# Patient Record
Sex: FEMALE | Race: WHITE | Employment: FULL TIME | URBAN - METROPOLITAN AREA
[De-identification: names, ages, dates, MRNs, and addresses within clinical notes are randomized per-mention and may not be internally consistent; named-entity substitution may affect disease eponyms.]

---

## 2017-02-27 PROCEDURE — 87086 URINE CULTURE/COLONY COUNT: CPT | Performed by: FAMILY MEDICINE

## 2017-02-27 PROCEDURE — 87186 SC STD MICRODIL/AGAR DIL: CPT | Performed by: FAMILY MEDICINE

## 2017-02-27 PROCEDURE — 87088 URINE BACTERIA CULTURE: CPT | Performed by: FAMILY MEDICINE

## 2017-10-13 PROCEDURE — 86803 HEPATITIS C AB TEST: CPT | Performed by: FAMILY MEDICINE

## 2018-02-09 PROBLEM — R09.82 POST-NASAL DRAINAGE: Status: ACTIVE | Noted: 2018-02-09

## 2018-02-09 PROBLEM — J30.1 NON-SEASONAL ALLERGIC RHINITIS DUE TO POLLEN: Status: ACTIVE | Noted: 2018-02-09

## 2019-01-18 PROBLEM — J30.1 NON-SEASONAL ALLERGIC RHINITIS DUE TO POLLEN: Status: RESOLVED | Noted: 2018-02-09 | Resolved: 2019-01-18

## 2019-01-18 PROBLEM — R09.82 POST-NASAL DRAINAGE: Status: RESOLVED | Noted: 2018-02-09 | Resolved: 2019-01-18

## 2019-01-18 PROBLEM — K21.9 GASTROESOPHAGEAL REFLUX DISEASE, ESOPHAGITIS PRESENCE NOT SPECIFIED: Status: ACTIVE | Noted: 2019-01-18

## 2019-01-18 PROBLEM — L71.9 ACNE ROSACEA: Status: ACTIVE | Noted: 2019-01-18

## 2019-01-18 PROBLEM — F41.9 ANXIETY: Status: ACTIVE | Noted: 2019-01-18

## 2019-01-18 PROBLEM — M54.2 MUSCULOSKELETAL NECK PAIN: Status: ACTIVE | Noted: 2019-01-18

## 2019-01-18 PROBLEM — S93.491A SPRAIN OF ANTERIOR TALOFIBULAR LIGAMENT OF RIGHT ANKLE, INITIAL ENCOUNTER: Status: ACTIVE | Noted: 2019-01-18

## 2019-01-18 PROBLEM — J30.9 ALLERGIC RHINITIS, UNSPECIFIED SEASONALITY, UNSPECIFIED TRIGGER: Status: ACTIVE | Noted: 2019-01-18

## 2019-08-09 PROBLEM — R13.10 DYSPHAGIA: Status: ACTIVE | Noted: 2019-08-09

## 2019-08-17 ENCOUNTER — HOSPITAL ENCOUNTER (OUTPATIENT)
Dept: GENERAL RADIOLOGY | Facility: HOSPITAL | Age: 54
Discharge: HOME | End: 2019-08-17
Attending: INTERNAL MEDICINE
Payer: COMMERCIAL

## 2019-08-17 DIAGNOSIS — R13.10 DYSPHAGIA, UNSPECIFIED TYPE: ICD-10-CM

## 2019-08-17 PROCEDURE — 74220 X-RAY XM ESOPHAGUS 1CNTRST: CPT | Performed by: INTERNAL MEDICINE

## 2019-08-17 PROCEDURE — 92611 MOTION FLUOROSCOPY/SWALLOW: CPT

## 2019-08-17 PROCEDURE — 74230 X-RAY XM SWLNG FUNCJ C+: CPT | Performed by: INTERNAL MEDICINE

## 2019-08-17 NOTE — PROGRESS NOTES
ADULT VIDEOFLUOROSCOPIC SWALLOWING STUDY    Admission Date: 8/17/2019  Evaluation Date: 08/17/19  Radiologist: Dr Tesha Queen completed in conjunction with Radiologist to assess oropharyngeal swallow function and assess questions answered to their apparent satisfaction. INTERDISCIPLINARY COMMUNICATION  Reviewed results with Radiologist; agreement verbalized.       Current status G Code: Initial, Swallowing, CH: 0% impaired  Goal status G Code: Swallowing, CH: 0% impaire

## 2019-08-24 NOTE — PROGRESS NOTES
Date: 2019    To: Zac Rosadotrey  : 1965    I hope this letter finds you doing well. I am writing to inform you of the following:        The results of your recent x-ray tests were normal.         Please call the office at [phone] if

## 2020-01-29 PROBLEM — K44.9 HIATAL HERNIA: Status: ACTIVE | Noted: 2020-01-29

## 2020-01-29 PROBLEM — K21.9 GASTROESOPHAGEAL REFLUX DISEASE, ESOPHAGITIS PRESENCE NOT SPECIFIED: Status: RESOLVED | Noted: 2019-01-18 | Resolved: 2020-01-29

## 2021-07-11 ENCOUNTER — HOSPITAL ENCOUNTER (OUTPATIENT)
Age: 56
Discharge: HOME | End: 2021-07-11
Payer: COMMERCIAL

## 2021-07-11 VITALS
SYSTOLIC BLOOD PRESSURE: 130 MMHG | DIASTOLIC BLOOD PRESSURE: 75 MMHG | HEART RATE: 55 BPM | TEMPERATURE: 99 F | RESPIRATION RATE: 12 BRPM | OXYGEN SATURATION: 100 %

## 2021-07-11 DIAGNOSIS — S16.1XXA STRAIN OF NECK MUSCLE, INITIAL ENCOUNTER: ICD-10-CM

## 2021-07-11 DIAGNOSIS — R42 VERTIGO: Primary | ICD-10-CM

## 2021-07-11 LAB
#MXD IC: 0.5 X10ˆ3/UL (ref 0.1–1)
CREAT BLD-MCNC: 0.7 MG/DL
GLUCOSE BLD-MCNC: 103 MG/DL (ref 70–99)
HCT VFR BLD AUTO: 35.3 %
HGB BLD-MCNC: 11.5 G/DL
ISTAT BUN: 17 MG/DL (ref 7–18)
ISTAT CHLORIDE: 101 MMOL/L (ref 98–112)
ISTAT HEMATOCRIT: 35 %
ISTAT IONIZED CALCIUM FOR CHEM 8: 1.29 MMOL/L (ref 1.12–1.32)
ISTAT POTASSIUM: 3.9 MMOL/L (ref 3.6–5.1)
ISTAT SODIUM: 140 MMOL/L (ref 136–145)
ISTAT TCO2: 27 MMOL/L (ref 21–32)
LYMPHOCYTES # BLD AUTO: 1.8 X10ˆ3/UL (ref 1–4)
LYMPHOCYTES NFR BLD AUTO: 30 %
MCH RBC QN AUTO: 30.5 PG (ref 26–34)
MCHC RBC AUTO-ENTMCNC: 32.6 G/DL (ref 31–37)
MCV RBC AUTO: 93.6 FL (ref 80–100)
MIXED CELL %: 7.9 %
NEUTROPHILS # BLD AUTO: 3.8 X10ˆ3/UL (ref 1.5–7.7)
NEUTROPHILS NFR BLD AUTO: 62.1 %
PLATELET # BLD AUTO: 280 X10ˆ3/UL (ref 150–450)
POCT BILIRUBIN URINE: NEGATIVE
POCT BLOOD URINE: NEGATIVE
POCT GLUCOSE URINE: NEGATIVE MG/DL
POCT KETONE URINE: NEGATIVE MG/DL
POCT LEUKOCYTE ESTERASE URINE: NEGATIVE
POCT NITRITE URINE: NEGATIVE
POCT PH URINE: 6.5 (ref 5–8)
POCT PROTEIN URINE: NEGATIVE MG/DL
POCT SPECIFIC GRAVITY URINE: 1.01
POCT URINE CLARITY: CLEAR
POCT URINE COLOR: YELLOW
POCT UROBILINOGEN URINE: 0.2 MG/DL
RBC # BLD AUTO: 3.77 X10ˆ6/UL
WBC # BLD AUTO: 6.1 X10ˆ3/UL (ref 4–11)

## 2021-07-11 PROCEDURE — 81002 URINALYSIS NONAUTO W/O SCOPE: CPT | Performed by: NURSE PRACTITIONER

## 2021-07-11 PROCEDURE — 80047 BASIC METABLC PNL IONIZED CA: CPT | Performed by: NURSE PRACTITIONER

## 2021-07-11 PROCEDURE — 85025 COMPLETE CBC W/AUTO DIFF WBC: CPT | Performed by: NURSE PRACTITIONER

## 2021-07-11 PROCEDURE — 93000 ELECTROCARDIOGRAM COMPLETE: CPT | Performed by: NURSE PRACTITIONER

## 2021-07-11 PROCEDURE — 99204 OFFICE O/P NEW MOD 45 MIN: CPT | Performed by: NURSE PRACTITIONER

## 2021-07-11 RX ORDER — MECLIZINE HCL 12.5 MG/1
25 TABLET ORAL ONCE
Status: COMPLETED | OUTPATIENT
Start: 2021-07-11 | End: 2021-07-11

## 2021-07-11 RX ORDER — METHYLPREDNISOLONE 4 MG/1
TABLET ORAL
Qty: 1 EACH | Refills: 0 | Status: SHIPPED | OUTPATIENT
Start: 2021-07-11 | End: 2022-01-06

## 2021-07-11 RX ORDER — SODIUM CHLORIDE 9 MG/ML
1000 INJECTION, SOLUTION INTRAVENOUS ONCE
Status: COMPLETED | OUTPATIENT
Start: 2021-07-11 | End: 2021-07-11

## 2021-07-11 RX ORDER — MECLIZINE HYDROCHLORIDE 25 MG/1
25 TABLET ORAL 3 TIMES DAILY PRN
Qty: 15 TABLET | Refills: 0 | Status: SHIPPED | OUTPATIENT
Start: 2021-07-11 | End: 2022-01-06

## 2021-07-11 NOTE — ED INITIAL ASSESSMENT (HPI)
Pt states she has had allergies lately and since Friday had some congestion and runny nose. Pt woke yesterday with positional dizziness. States when she lays down she is dizzy but it goes away when upright. Reports some neck spasm during the night.

## 2021-07-11 NOTE — ED PROVIDER NOTES
Patient Seen in: Immediate [facility]      History   No chief complaint on file. Stated Complaint: Dizziness     HPI/Subjective:   13-year-old female presents today with complaints of positional dizziness.   States that about 3 nights ago woke up in t EPIDURAL;  Surgeon: Soledad Ruano MD;  Location: [facility]   • INJECTION, ANESTHETIC/STEROID, TRANSFORAMINAL EPIDURAL; LUMBAR/SACRAL, SINGLE LEVEL  12/3/2012    Procedure: TRANSFORAMINAL EPIDURAL - LUMBAR;  Surgeon: Wilner Santos Mouth: Mucous membranes are moist.   Eyes:      Extraocular Movements:      Right eye: Nystagmus present. Left eye: Nystagmus present. Conjunctiva/sclera: Conjunctivae normal.      Pupils: Pupils are equal, round, and reactive to light.    Cardio Patient resents today with complaints of left-sided neck pain with vertigo. Does have increased dizziness when laying flat or laying on her side. Does not complain of any dizziness when sitting upright or walking.   Neuro exam was normal.  Urine dip s

## 2021-07-12 LAB
ATRIAL RATE: 62 BPM
P AXIS: 68 DEGREES
P-R INTERVAL: 162 MS
Q-T INTERVAL: 416 MS
QRS DURATION: 90 MS
QTC CALCULATION (BEZET): 422 MS
R AXIS: 41 DEGREES
T AXIS: 39 DEGREES
VENTRICULAR RATE: 62 BPM

## 2022-12-19 ENCOUNTER — LAB ENCOUNTER (OUTPATIENT)
Dept: LAB | Age: 57
End: 2022-12-19
Attending: INTERNAL MEDICINE
Payer: COMMERCIAL

## 2022-12-19 DIAGNOSIS — Z01.818 PRE-OP TESTING: ICD-10-CM

## 2022-12-20 LAB — SARS-COV-2 RNA RESP QL NAA+PROBE: NOT DETECTED

## 2022-12-22 PROBLEM — R13.10 DYSPHAGIA, UNSPECIFIED: Status: ACTIVE | Noted: 2022-12-22

## 2022-12-22 PROBLEM — Z12.11 ENCOUNTER FOR SCREENING COLONOSCOPY: Status: ACTIVE | Noted: 2022-12-22

## 2022-12-24 ENCOUNTER — HOSPITAL ENCOUNTER (OUTPATIENT)
Age: 57
Discharge: HOME | End: 2022-12-24
Payer: COMMERCIAL

## 2022-12-24 VITALS
HEART RATE: 88 BPM | SYSTOLIC BLOOD PRESSURE: 126 MMHG | DIASTOLIC BLOOD PRESSURE: 70 MMHG | RESPIRATION RATE: 18 BRPM | TEMPERATURE: 98 F | OXYGEN SATURATION: 100 %

## 2022-12-24 DIAGNOSIS — N30.01 ACUTE CYSTITIS WITH HEMATURIA: Primary | ICD-10-CM

## 2022-12-24 LAB
POCT BILIRUBIN URINE: NEGATIVE
POCT GLUCOSE URINE: NEGATIVE MG/DL
POCT KETONE URINE: NEGATIVE MG/DL
POCT NITRITE URINE: POSITIVE
POCT PH URINE: 6 (ref 5–8)
POCT SPECIFIC GRAVITY URINE: 1.02
POCT UROBILINOGEN URINE: 0.2 MG/DL

## 2022-12-24 PROCEDURE — 81002 URINALYSIS NONAUTO W/O SCOPE: CPT | Performed by: NURSE PRACTITIONER

## 2022-12-24 PROCEDURE — 99213 OFFICE O/P EST LOW 20 MIN: CPT | Performed by: NURSE PRACTITIONER

## 2022-12-24 RX ORDER — NITROFURANTOIN 25; 75 MG/1; MG/1
100 CAPSULE ORAL 2 TIMES DAILY
Qty: 14 CAPSULE | Refills: 0 | Status: SHIPPED | OUTPATIENT
Start: 2022-12-24 | End: 2022-12-27 | Stop reason: ALTCHOICE

## 2022-12-24 RX ORDER — PHENAZOPYRIDINE HYDROCHLORIDE 100 MG/1
100 TABLET, FILM COATED ORAL 3 TIMES DAILY PRN
Qty: 6 TABLET | Refills: 0 | Status: SHIPPED | OUTPATIENT
Start: 2022-12-24 | End: 2022-12-31

## 2022-12-27 RX ORDER — CIPROFLOXACIN 500 MG/1
500 TABLET, FILM COATED ORAL 2 TIMES DAILY
Qty: 10 TABLET | Refills: 0 | Status: SHIPPED | OUTPATIENT
Start: 2022-12-27 | End: 2023-01-01

## 2023-03-26 ENCOUNTER — HOSPITAL ENCOUNTER (OUTPATIENT)
Age: 58
Discharge: HOME | End: 2023-03-26
Payer: COMMERCIAL

## 2023-03-26 VITALS
DIASTOLIC BLOOD PRESSURE: 75 MMHG | OXYGEN SATURATION: 100 % | TEMPERATURE: 98 F | RESPIRATION RATE: 17 BRPM | SYSTOLIC BLOOD PRESSURE: 146 MMHG | HEART RATE: 67 BPM

## 2023-03-26 DIAGNOSIS — R00.2 PALPITATIONS: Primary | ICD-10-CM

## 2023-03-26 LAB
#MXD IC: 0.6 X10ˆ3/UL (ref 0.1–1)
ATRIAL RATE: 73 BPM
BUN BLD-MCNC: 16 MG/DL (ref 7–18)
CHLORIDE BLD-SCNC: 101 MMOL/L (ref 98–112)
CO2 BLD-SCNC: 26 MMOL/L (ref 21–32)
CREAT BLD-MCNC: 0.7 MG/DL
GFR SERPLBLD BASED ON 1.73 SQ M-ARVRAT: 100 ML/MIN/1.73M2 (ref 60–?)
GLUCOSE BLD-MCNC: 97 MG/DL (ref 70–99)
HCT VFR BLD AUTO: 37.2 %
HCT VFR BLD CALC: 36 %
HGB BLD-MCNC: 12.2 G/DL
ISTAT IONIZED CALCIUM FOR CHEM 8: 1.15 MMOL/L (ref 1.12–1.32)
LYMPHOCYTES # BLD AUTO: 1.8 X10ˆ3/UL (ref 1–4)
LYMPHOCYTES NFR BLD AUTO: 30.2 %
MCH RBC QN AUTO: 30.3 PG (ref 26–34)
MCHC RBC AUTO-ENTMCNC: 32.8 G/DL (ref 31–37)
MCV RBC AUTO: 92.3 FL (ref 80–100)
MIXED CELL %: 10.5 %
NEUTROPHILS # BLD AUTO: 3.4 X10ˆ3/UL (ref 1.5–7.7)
NEUTROPHILS NFR BLD AUTO: 59.3 %
P AXIS: 78 DEGREES
P-R INTERVAL: 158 MS
PLATELET # BLD AUTO: 277 X10ˆ3/UL (ref 150–450)
POTASSIUM BLD-SCNC: 3.8 MMOL/L (ref 3.6–5.1)
Q-T INTERVAL: 380 MS
QRS DURATION: 92 MS
QTC CALCULATION (BEZET): 418 MS
R AXIS: 36 DEGREES
RBC # BLD AUTO: 4.03 X10ˆ6/UL
SODIUM BLD-SCNC: 139 MMOL/L (ref 136–145)
T AXIS: 50 DEGREES
TROPONIN I BLD-MCNC: <0.02 NG/ML
VENTRICULAR RATE: 73 BPM
WBC # BLD AUTO: 5.8 X10ˆ3/UL (ref 4–11)

## 2023-03-26 PROCEDURE — 93000 ELECTROCARDIOGRAM COMPLETE: CPT | Performed by: NURSE PRACTITIONER

## 2023-03-26 PROCEDURE — 99213 OFFICE O/P EST LOW 20 MIN: CPT | Performed by: NURSE PRACTITIONER

## 2023-03-26 PROCEDURE — 80047 BASIC METABLC PNL IONIZED CA: CPT | Performed by: NURSE PRACTITIONER

## 2023-03-26 PROCEDURE — 84484 ASSAY OF TROPONIN QUANT: CPT | Performed by: NURSE PRACTITIONER

## 2023-03-26 PROCEDURE — 85025 COMPLETE CBC W/AUTO DIFF WBC: CPT | Performed by: NURSE PRACTITIONER

## 2023-03-26 RX ORDER — TRIAMCINOLONE ACETONIDE 1 MG/G
CREAM TOPICAL
COMMUNITY
Start: 2022-09-16

## 2023-03-26 RX ORDER — SODIUM CHLORIDE 9 MG/ML
1000 INJECTION, SOLUTION INTRAVENOUS ONCE
Status: COMPLETED | OUTPATIENT
Start: 2023-03-26 | End: 2023-03-26

## 2023-03-26 RX ORDER — LACTOBACILLUS ACIDOPHILUS 10B CELL
CAPSULE ORAL AS DIRECTED
COMMUNITY

## 2023-03-26 RX ORDER — ESTRADIOL 0.1 MG/G
CREAM VAGINAL
COMMUNITY
Start: 2022-11-04

## 2023-03-26 NOTE — ED INITIAL ASSESSMENT (HPI)
Pt has been feeling lightheaded for one week, has been checking her b/p, last night her monitor read \"irregular\" heart beat. Denies CP/SOB.

## 2023-03-26 NOTE — DISCHARGE INSTRUCTIONS
You need to call cardiology tomorrow     Take medications as prescribed     You should return to the ER with any worsening symptoms or concerns.

## 2023-08-09 ENCOUNTER — HOSPITAL ENCOUNTER (OUTPATIENT)
Age: 58
Discharge: HOME | End: 2023-08-09
Payer: COMMERCIAL

## 2023-08-09 VITALS
WEIGHT: 130 LBS | OXYGEN SATURATION: 100 % | TEMPERATURE: 98 F | SYSTOLIC BLOOD PRESSURE: 124 MMHG | DIASTOLIC BLOOD PRESSURE: 63 MMHG | HEART RATE: 90 BPM | RESPIRATION RATE: 16 BRPM | HEIGHT: 64 IN | BODY MASS INDEX: 22.2 KG/M2

## 2023-08-09 DIAGNOSIS — N30.01 ACUTE CYSTITIS WITH HEMATURIA: Primary | ICD-10-CM

## 2023-08-09 LAB
BILIRUB UR QL STRIP: NEGATIVE
COLOR UR: YELLOW
GLUCOSE UR STRIP-MCNC: NEGATIVE MG/DL
KETONES UR STRIP-MCNC: NEGATIVE MG/DL
NITRITE UR QL STRIP: NEGATIVE
PH UR STRIP: 7 [PH]
PROT UR STRIP-MCNC: NEGATIVE MG/DL
SP GR UR STRIP: 1.01
UROBILINOGEN UR STRIP-ACNC: <2 MG/DL

## 2023-08-09 PROCEDURE — 99213 OFFICE O/P EST LOW 20 MIN: CPT | Performed by: PHYSICIAN ASSISTANT

## 2023-08-09 PROCEDURE — 81002 URINALYSIS NONAUTO W/O SCOPE: CPT | Performed by: PHYSICIAN ASSISTANT

## 2023-08-09 RX ORDER — PHENAZOPYRIDINE HYDROCHLORIDE 100 MG/1
100 TABLET, FILM COATED ORAL 3 TIMES DAILY PRN
Qty: 6 TABLET | Refills: 0 | Status: SHIPPED | OUTPATIENT
Start: 2023-08-09 | End: 2023-08-16

## 2023-08-09 RX ORDER — CIPROFLOXACIN 500 MG/1
500 TABLET, FILM COATED ORAL 2 TIMES DAILY
Qty: 10 TABLET | Refills: 0 | Status: SHIPPED | OUTPATIENT
Start: 2023-08-09 | End: 2023-08-14

## 2023-08-09 NOTE — DISCHARGE INSTRUCTIONS
Take Cipro twice a day until gone  Use Pyridium as needed for pain  Follow-up with primary care doctor in 48-72 hours for recheck   Return to the Urgent care or emergency room if symptoms worsen

## 2023-08-09 NOTE — ED INITIAL ASSESSMENT (HPI)
Pt with c/o urinary urgency and frequency, has a different sensation when urinating but unable to describe. Denies pain or burning with urination. Noticed blood in the urine this morning. Has had symptoms for 3 days.        Pt states she also has some lower back pain but unsure if related because she has been having sciatic pain for over a week now

## 2023-12-12 ENCOUNTER — TELEPHONE (OUTPATIENT)
Dept: UROLOGY | Facility: CLINIC | Age: 58
End: 2023-12-12

## 2023-12-12 NOTE — TELEPHONE ENCOUNTER
Returned patient's voicemail regarding upcoming NP appointment in 03/2024. Patient states that since she is having CT of her abdominal, patient asked if she needed any other \"pictures\" taken while she's \"in there\". Patient expressed understanding that as patient has not yet been seen, physician will not be able to assess until appt.   Patient confirmed March appointment and confirmed being on waitlist.

## 2024-03-01 ENCOUNTER — TELEPHONE (OUTPATIENT)
Dept: UROLOGY | Facility: CLINIC | Age: 59
End: 2024-03-01

## 2024-03-01 NOTE — TELEPHONE ENCOUNTER
Spoke with patient to remind of 3/3/24 appointment. Provided New Patient instructions, patient expressed understanding and confirmed appointment.

## 2024-03-05 ENCOUNTER — OFFICE VISIT (OUTPATIENT)
Dept: UROLOGY | Facility: CLINIC | Age: 59
End: 2024-03-05
Attending: OBSTETRICS & GYNECOLOGY
Payer: COMMERCIAL

## 2024-03-05 VITALS
HEIGHT: 63 IN | BODY MASS INDEX: 24.84 KG/M2 | WEIGHT: 140.19 LBS | TEMPERATURE: 98 F | DIASTOLIC BLOOD PRESSURE: 76 MMHG | SYSTOLIC BLOOD PRESSURE: 122 MMHG

## 2024-03-05 DIAGNOSIS — N81.2 UTEROVAGINAL PROLAPSE, INCOMPLETE: ICD-10-CM

## 2024-03-05 DIAGNOSIS — N39.3 FEMALE STRESS INCONTINENCE: ICD-10-CM

## 2024-03-05 DIAGNOSIS — N39.0 RECURRENT UTI: Primary | ICD-10-CM

## 2024-03-05 DIAGNOSIS — R35.1 NOCTURIA: ICD-10-CM

## 2024-03-05 DIAGNOSIS — N81.84 PELVIC MUSCLE WASTING: ICD-10-CM

## 2024-03-05 DIAGNOSIS — N95.2 POSTMENOPAUSAL ATROPHIC VAGINITIS: ICD-10-CM

## 2024-03-05 DIAGNOSIS — N39.41 URGE INCONTINENCE: ICD-10-CM

## 2024-03-05 LAB
BLOOD URINE: NEGATIVE
CONTROL RUN WITHIN 24 HOURS?: YES
NITRITE URINE: NEGATIVE

## 2024-03-05 PROCEDURE — 51701 INSERT BLADDER CATHETER: CPT | Performed by: OBSTETRICS & GYNECOLOGY

## 2024-03-05 PROCEDURE — 87086 URINE CULTURE/COLONY COUNT: CPT | Performed by: OBSTETRICS & GYNECOLOGY

## 2024-03-05 PROCEDURE — 81002 URINALYSIS NONAUTO W/O SCOPE: CPT | Performed by: OBSTETRICS & GYNECOLOGY

## 2024-03-05 PROCEDURE — 99212 OFFICE O/P EST SF 10 MIN: CPT

## 2024-03-05 RX ORDER — METHENAMINE HIPPURATE 1000 MG/1
1 TABLET ORAL 2 TIMES DAILY
Qty: 180 TABLET | Refills: 1 | Status: SHIPPED | OUTPATIENT
Start: 2024-03-05

## 2024-03-05 NOTE — PROCEDURES
Pt voided 340mL in privacy of the bathroom.  PVR collected per Dr. العراقي = 10mL.  See dipstick, collected and sent for culture.

## 2024-03-05 NOTE — PROGRESS NOTES
Natividad العراقي, DO  3/5/2024     Prev pt  Last seen , rx'd PFPT (50% improvement)  Did PTNS, insurance coverage issues - stopped    Chief Complaint   Patient presents with    Recurrent UTI     Self referred, previous [facility] patient of Dr. العراقي, last visit 10- for UVP, LISSETTE, UUI    Incontinence     UUI>LISSETTE     HPI:  +LISSETTE  +UUI  Nocturia x2  +prolapse  Not sexually active, n/a dyspareunia  Reg bowels    PRIOR TREATMENTS:    Kegels  Estrace Cream    + UTIs  23 >100k proteus s/p cipro  10/23/23 50-100k klebsiella s/p NTF  23 klebsiella   klebsiella    No gross hematuria    , vdx 2    Known kidney stones  CT - no hydro, multi calculi (7mm, 2mm, 2mm)  MRI - renal cyst    Saw urology - latchemsetty, exp mgmt    UDS 2019  Nl voids  nursing home 274cc  DO at capacity  LISSETTE at 100cc    Vitals:  /76   Temp 98 °F (36.7 °C)   Ht 63\"   Wt 140 lb 3.2 oz (63.6 kg)   BMI 24.84 kg/m²      HISTORY:  Past Medical History:   Diagnosis Date    Abdominal hernia     Abnormal mammogram 2014    right breast    Anemia     Arthritis     Back pain     Belching     Blurred vision     Calculus of kidney     Change in hair     Endometriosis     Environmental allergies     Fatigue     Flatulence/gas pain/belching     Food intolerance     Frequent urination     Gastroesophageal reflux disease, esophagitis presence not specified 2019    Headache disorder     Hearing loss     Heart palpitations     Hemorrhoids     Hoarseness, chronic     Itch of skin     Leaking of urine     OTHER DISEASES     back pain    Pain in joints     Painful swallowing     Problems with swallowing     Raynaud's disease     Sleep disturbance     Stress     Vertigo     Wears glasses       Past Surgical History:   Procedure Laterality Date    COLONOSCOPY      EGD      FLUOR GID & LOCLZJ NDL/CATH SPI DX/THER NJX  2012    Procedure: LUMBAR EPIDURAL;  Surgeon: Ferny Landis MD;  Location: [facility]    FLUOR GID  & LOCLZJ NDL/CATH SPI DX/THER NJX  01/21/2013    Procedure: LUMBAR EPIDURAL;  Surgeon: Ferny Landis MD;  Location: [facility]    INJECTION THERAPY VEIN,MULT VEINS Bilateral 03/24/2023    INJECTION, ANESTHETIC/STEROID, TRANSFORAMINAL EPIDURAL; LUMBAR/SACRAL, SINGLE LEVEL  12/03/2012    Procedure: TRANSFORAMINAL EPIDURAL - LUMBAR;  Surgeon: Ferny Landis MD;  Location: [facility]    INJECTION, W/WO CONTRAST, DX/THERAPEUTIC SUBSTANCE, EPIDURAL/SUBARACHNOID; LUMBAR/SACRAL  12/17/2012    Procedure: LUMBAR EPIDURAL;  Surgeon: Ferny Landis MD;  Location: [facility]    INJECTION, W/WO CONTRAST, DX/THERAPEUTIC SUBSTANCE, EPIDURAL/SUBARACHNOID; LUMBAR/SACRAL  01/21/2013    Procedure: LUMBAR EPIDURAL;  Surgeon: Ferny Landis MD;  Location: [facility]    OTHER SURGICAL HISTORY  2006    vein stripping    OTHER SURGICAL HISTORY  2006    ovarian cyst    STAB PHLEBECTOMY, VARICOSE VEINS, 1 EXTREMITY; >20 INCISIONS  2003    EVLT    TONSILLECTOMY        Family History   Problem Relation Age of Onset    Heart Disorder Father         Heart attack-stent palced    Hypertension Father     Lipids Father     Heart Attack Father     Colon Polyps Father     Hypertension Mother     Lipids Mother     Psychiatric Mother         Depression    Mental Disorder Mother     Colon Polyps Mother     Psychiatric Daughter         Anxiety disorder    Other (Other) Son         Learning disability    Cancer Other 40        breast cancer at age 40/ovarian cancer    Hypertension Sister     Hypertension Brother     Hypertension Sister     Breast Cancer Maternal Aunt       Social History     Socioeconomic History    Marital status:    Tobacco Use    Smoking status: Never    Smokeless tobacco: Never   Substance and Sexual Activity    Alcohol use: Yes     Comment: Social    Drug use: No        Allergies:  Allergies   Allergen Reactions    Pcn [Penicillins] RASH     Bactrim [Sulfamethoxazole W/Trimethoprim] RASH       Medications:  Outpatient Medications Prior to Visit   Medication Sig Dispense Refill    estradiol 0.1 MG/GM Vaginal Cream       Lactobacillus (REJUVAFLOR) Oral Cap Take by mouth As Directed.      Ipratropium Bromide 0.06 % Nasal Solution 2 sprays by Nasal route 3 (three) times daily as needed for Rhinitis. 1 each 5    artificial tears 83-15 % Ophthalmic Ointment Place 1 Application into both eyes 3 (three) times daily. 3 Tube 3    Psyllium (METAMUCIL FIBER OR) Take by mouth.      Probiotic Product (PROBIOTIC-10 OR) Take by mouth.      Cholecalciferol (VITAMIN D) 2000 units Oral Cap Take by mouth daily.  0    MetroNIDAZOLE 1 % External Gel Apply to AA's of face BID. 60 g 3    triamcinolone 0.1 % External Cream Apply to AA of skin BID x 2 weeks, then hold 2 weeks, repeat prn (Patient not taking: Reported on 8/9/2023)      PEG 3350-KCl-Na Bicarb-NaCl 420 g Oral Recon Soln Take 4,000 mL by mouth As Directed. (Patient not taking: Reported on 8/9/2023) 4000 mL 0    Mupirocin Calcium (BACTROBAN NASAL) 2 % Nasal Ointment Apply BID to left nostril x 1 month (Patient not taking: Reported on 8/9/2023) 1 g 0     No facility-administered medications prior to visit.       Urogynecology Summary:  Urogynecology Summary  Prolapse: Yes  LISSETTE: Yes  Urge Incontinence: Yes  Nocturia Frequency: 2  Frequency: 2 - 3 hours  Incomplete emptying: No  Constipation: No  Wears pad day?: 0  Wears Pad Night?: 0  Activities are limited by UI/POP?: No  Currently Sexually Active: No    Review of Systems:    A comprehensive 12 point review of systems was completed.  Pertinent positives noted in the the HPI.  No CP  No SOB    GENERAL EXAM:  GENERAL:  Alert and Oriented, and NAD  HEENT:  Normal, no lesions  LUNGS:  Normal effort  HEART:  RRR  ABDOMEN: soft, no mass, no hernia  EXTREM:  Normal, no edema  SKIN:  Normal, no lesions    PELVIC EXAM:  Ext. Gen: no atrophy, no lesions  Urethra: some atrophy,  nontender  Bladder:some fullness, nontender  Vagina: some atrophy  Cervix: no bleeding,  no lesions, nontender  Uterus: +mobile  Adnexa:no masses, nontender  Perineum: nontender  Anus: wnl  Rectum: defer    PELVIS FLOOR NEUROMUSCULAR FUNCTION:  Strength:  1 and Unable to hold greater than 3 sec  Perineal Sensation:  Normal      PELVIC SUPPORT:  [place]:  2  Ant:  2  Post:  2  CST:  negative  UVJ: somewhat hypermobile    Voided in BR 340cc, sterile technique used to empty bladder 10cc (specimen sent)    Impression/Plan:    ICD-10-CM    1. Recurrent UTI  N39.0 POCT urinalysis dipstick[[id number]]     Urine Culture, Routine     Straight Cath PVR     methenamine 1 g Oral Tab      2. Uterovaginal prolapse, incomplete  N81.2 Straight Cath PVR      3. Female stress incontinence  N39.3 POCT urinalysis dipstick[[id number]]     Urine Culture, Routine     Straight Cath PVR      4. Urge incontinence  N39.41 POCT urinalysis dipstick[[id number]]     Urine Culture, Routine     Straight Cath PVR      5. Nocturia  R35.1 POCT urinalysis dipstick[[id number]]     Urine Culture, Routine     Straight Cath PVR      6. Pelvic muscle wasting  N81.84       7. Postmenopausal atrophic vaginitis  N95.2           Discussion Items:   Behavioral and pharmacologic treatments for OAB  Nonsurgical and surgical treatments for Stress Urinary Incontinence  Nonsurgical and surgical treatments for POP  Pelvic muscle rehabilitation including pelvic floor PT  Topical estrogen therapy for treating UGA  Behavioral and medical treatment for recurrent UTIs  Bowel routine/constipation regimen  Discussed dietary and behavioral modification, discussed pharmacologic and nonpharmacologic mgmt options for urinary symptoms. Discussed dietary & weight management with potential improvements in symptoms with weight loss.    Discussed mgmt of vulvovaginal atrophy with vaginal estrogen cream. Reviewed associated benefits, risks, alternatives, and goals. Recommend low dose twice weekly mgmt    Cont vag estrogen    Discussed management of recurrent urinary tract infections. Discussed use of pharmacologic treatment options for suppression therapy. Risks vs benefits reviewed with patient   Discussed hiprex vs abx suppression, pt prefers to avoid abx  Trial hiprex 1g BID with vit C  If UTIs persist will need abx suppression  If UTIs persist despite above may need urology mgmt of multi kidney stones    Complex renal cyst surveillance per urology    Diagnostic Items:  Urine testing, follow results, tx if +  Has urine container, will call with s/sx of UTI    Medications Discussed:  Estrace Cream  Hiprex vs abx    Treatment Plan, Non-surgical:   RN teaching/pt education done  Estrace / Premarin cream  Hiprex 1g BID with vit C    Treatment Plan, Surgical:   None    Pt verbalizes understanding of all above discussed information. All questions answered. She agrees to plan    Return in about 3 months (around 6/5/2024) for UTI f/u, if UTIs persisist consider abx suppression.    Natividad العراقي, DO, FACOG, FACS      Discussion undertaken in English, info provided